# Patient Record
Sex: FEMALE | Race: WHITE
[De-identification: names, ages, dates, MRNs, and addresses within clinical notes are randomized per-mention and may not be internally consistent; named-entity substitution may affect disease eponyms.]

---

## 2019-06-27 ENCOUNTER — HOSPITAL ENCOUNTER (OUTPATIENT)
Dept: HOSPITAL 89 - MAMO | Age: 64
End: 2019-06-27
Attending: NURSE PRACTITIONER
Payer: COMMERCIAL

## 2019-06-27 DIAGNOSIS — Z12.31: Primary | ICD-10-CM

## 2019-06-27 DIAGNOSIS — M85.89: ICD-10-CM

## 2019-06-27 PROCEDURE — 77080 DXA BONE DENSITY AXIAL: CPT

## 2019-06-27 PROCEDURE — 77067 SCR MAMMO BI INCL CAD: CPT

## 2019-06-27 PROCEDURE — 77063 BREAST TOMOSYNTHESIS BI: CPT

## 2019-06-27 NOTE — RADIOLOGY IMAGING REPORT
FACILITY: [facility] [place] 

 

PATIENT NAME: Rafaela Weinstein

: 1955

MR: [id number]

V: [id number]

EXAM DATE: 

ORDERING PHYSICIAN: LANNY HERNANDEZ

TECHNOLOGIST: 

 

Location: [facility]

Patient: Rafaela Weinstein

: 1955

MRN: [id number]

Visit/Account:[id number]

Date of Sevice:  2019

 

ACCESSION #: [id number]

 

DEXA Scan

 

Clinical history:  Osteopenia.

 

Comparison:  DEXA scan from 10/19/2016.

 

LUMBAR SPINE:

The bone mineral density (BMD) measured from L1-L4 correlates with a Z-score of 0.3 and a T-score of 
-1.2 which is osteopenia as defined by the World Health Organization.  The corresponding risk of frac
ture in the lumbar spine is 2-3 times increased compared with a young adult reference population.  Th
is value has decrease by 3.3 % since the prior study.  More than 5% change is considered significant.


 

HIP:

Bone mineral density (BMD) measured in the LEFT total hip region correlates with a Z-score -0.2 and a
 T-score of -1.4 which is osteopenia as defined by the World Health Organization.  The corresponding 
risk of fracture in the hip is to 3 times increased compared to a young adult reference population. T
his value has decrease by four % since the prior study.  More than 5% change is considered significan
t.  T score left femoral neck -2.2

 

Bone mineral density (BMD) measured in the Femoral Neck region measures 0.732 g/cm?.

 

IMPRESSION:

1.  Lumbar spine: Osteopenia.  There has been 2.3% decrease in the bone mineral density since the pre
vious exam.

2.  Left Total Hip:  Osteopenia. There has been 4% decrease in the bone mineral density since the pre
vious exam.

3. Femoral Neck: Bone Mineral Density is 0.732 g/cm?

 

The next DEXA scan of this patient should include the following sites: L1-L4 and the left hip.

 

FRAX? WHO Fracture Risk Assessment Tool link:

<http://www.shef.ac.uk/FRAX/tool.jsp?locationValue=9>

 

 

PLEASE NOTE:

1)  The World Health Organization defines low BMD as follows:

                                                                      T-score

                   Normal                                  > -1

                   Osteopenia                           < -1 and  > -2.5

                   Osteoporosis                         < -2.5 without fractures

                   Established osteoporosis       < -2.5 with fractures

2)  In general, you may wish to consider:

                    Diagnosis                  Treatment                       Follow-up DEXA

 

                    Normal BMD            Prevention                      2-3 years

                    Osteopenia                Prevention/therapy         1-2 years

                    Osteoporosis             Therapy                           Yearly

3)  Fracture risk estimated from the T-score is more accurate for vertebral fractures (often spontane
ous) than for hip fractures.

 

Report Dictated By: Catherine Bhakta MD at 2019 2:34 PM

 

Report E-Signed By: Catherine Bhakta MD  at 2019 2:35 PM

 

WSN:[id number]

## 2019-06-28 NOTE — RADIOLOGY IMAGING REPORT
[place] [facility] 

 

PATIENT NAME: JOHN HAWKINS

: [id number]

MR: [id number]

V: [id number]

EXAM DATE: 

ORDERING PHYSICIAN: LANNY HERNANDEZ

TECHNOLOGIST: Maureen Mehta

 

PROCEDURE: BILATERAL DIGITAL SCREENING MAMMOGRAM WITH CAD ASSISTED 

INTERPRETATION & 3D TOMOSYNTHESIS

 

REASON FOR STUDY:  Screening

 

FAMILY HISTORY OF BREAST CANCER:  None

 

BREAST PROCEDURES/TREATMENTS:  None

 

COMPARISON:  17, 10/19/16, 14, 12

 

VIEWS OBTAINED:  Bilateral 2D & 3D full field CC & MLO projections

 

BREAST DENSITY:  There are scattered areas of fibroglandular density 

throughout the breasts.

 

MAMMOGRAM FINDINGS: The parenchymal pattern has remained stable 

allowing for difference in mammographic technique & patient 

positioning.

 

IMPRESSION: 

BIRADS 1: Negative.

 

DIAGNOSTIC CATEGORY 1--NEGATIVE.  

 

 

RECOMMENDATIONS:

ROUTINE MAMMOGRAM AND CLINICAL EVALUATION.   

 

Dictated by:  Catherine Bhakta M.D. on 2019 at 16:40   

Transcribed by: KAYLA on 2019 at 6:55    

Approved by:  Catherine Bhakta M.D. on 2019 at 8:19   

[facility]